# Patient Record
Sex: FEMALE | Race: WHITE | NOT HISPANIC OR LATINO
[De-identification: names, ages, dates, MRNs, and addresses within clinical notes are randomized per-mention and may not be internally consistent; named-entity substitution may affect disease eponyms.]

---

## 2019-04-08 ENCOUNTER — APPOINTMENT (OUTPATIENT)
Dept: OBGYN | Facility: CLINIC | Age: 49
End: 2019-04-08

## 2019-07-22 ENCOUNTER — APPOINTMENT (OUTPATIENT)
Dept: OBGYN | Facility: CLINIC | Age: 49
End: 2019-07-22
Payer: COMMERCIAL

## 2019-07-22 VITALS
HEIGHT: 61 IN | SYSTOLIC BLOOD PRESSURE: 110 MMHG | WEIGHT: 100 LBS | DIASTOLIC BLOOD PRESSURE: 70 MMHG | BODY MASS INDEX: 18.88 KG/M2

## 2019-07-22 DIAGNOSIS — N63.0 UNSPECIFIED LUMP IN UNSPECIFIED BREAST: ICD-10-CM

## 2019-07-22 PROCEDURE — 99212 OFFICE O/P EST SF 10 MIN: CPT | Mod: 25

## 2019-07-22 PROCEDURE — G0101: CPT

## 2019-07-22 NOTE — PHYSICAL EXAM
[Awake] : awake [Alert] : alert [Acute Distress] : no acute distress [Mass] : no breast mass [Axillary LAD] : no axillary lymphadenopathy [Nipple Discharge] : no nipple discharge [Soft] : soft [Tender] : non tender [Oriented x3] : oriented to person, place, and time [Uterine Adnexae] : were not tender and not enlarged [No Bleeding] : there was no active vaginal bleeding [Normal] : uterus [RRR, No Murmurs] : RRR, no murmurs [CTAB] : CTAB

## 2019-07-25 LAB
CYTOLOGY CVX/VAG DOC THIN PREP: ABNORMAL
HPV HIGH+LOW RISK DNA PNL CVX: NOT DETECTED

## 2021-06-15 ENCOUNTER — APPOINTMENT (OUTPATIENT)
Dept: OBGYN | Facility: CLINIC | Age: 51
End: 2021-06-15
Payer: COMMERCIAL

## 2021-06-15 VITALS
DIASTOLIC BLOOD PRESSURE: 74 MMHG | SYSTOLIC BLOOD PRESSURE: 123 MMHG | BODY MASS INDEX: 17.18 KG/M2 | HEIGHT: 61 IN | WEIGHT: 91 LBS

## 2021-06-15 DIAGNOSIS — Z00.00 ENCOUNTER FOR GENERAL ADULT MEDICAL EXAMINATION W/OUT ABNORMAL FINDINGS: ICD-10-CM

## 2021-06-15 PROCEDURE — 99396 PREV VISIT EST AGE 40-64: CPT

## 2021-06-15 PROCEDURE — 99213 OFFICE O/P EST LOW 20 MIN: CPT | Mod: 25

## 2021-06-15 PROCEDURE — 99072 ADDL SUPL MATRL&STAF TM PHE: CPT

## 2021-06-15 NOTE — DISCUSSION/SUMMARY
[FreeTextEntry1] : annual exam\par pap done\par ref mammo, dexa.\par pmb- check pel us, endo bx/see.

## 2021-06-15 NOTE — HISTORY OF PRESENT ILLNESS
[FreeTextEntry1] : 51 yo pt here for annual exam. co brown spotting for past 2 months. lmp around age 43, never had vb since until now.

## 2021-06-22 DIAGNOSIS — B96.89 ACUTE VAGINITIS: ICD-10-CM

## 2021-06-22 DIAGNOSIS — N76.0 ACUTE VAGINITIS: ICD-10-CM

## 2021-06-22 LAB
A VAGINAE DNA VAG QL NAA+PROBE: NORMAL
BVAB2 DNA VAG QL NAA+PROBE: NORMAL
C KRUSEI DNA VAG QL NAA+PROBE: NEGATIVE
C TRACH RRNA SPEC QL NAA+PROBE: NEGATIVE
CYTOLOGY CVX/VAG DOC THIN PREP: NORMAL
MEGA1 DNA VAG QL NAA+PROBE: ABNORMAL
N GONORRHOEA RRNA SPEC QL NAA+PROBE: NEGATIVE
T VAGINALIS RRNA SPEC QL NAA+PROBE: NEGATIVE

## 2021-06-25 RX ORDER — MISOPROSTOL 200 UG/1
200 TABLET ORAL
Qty: 2 | Refills: 0 | Status: ACTIVE | COMMUNITY
Start: 2021-06-25 | End: 1900-01-01

## 2021-06-30 RX ORDER — MISOPROSTOL 200 UG/1
200 TABLET ORAL
Qty: 2 | Refills: 0 | Status: ACTIVE | COMMUNITY
Start: 2021-06-30 | End: 1900-01-01

## 2021-07-08 ENCOUNTER — APPOINTMENT (OUTPATIENT)
Dept: OBGYN | Facility: CLINIC | Age: 51
End: 2021-07-08
Payer: COMMERCIAL

## 2021-07-08 ENCOUNTER — ASOB RESULT (OUTPATIENT)
Age: 51
End: 2021-07-08

## 2021-07-08 VITALS
WEIGHT: 95 LBS | BODY MASS INDEX: 17.94 KG/M2 | HEIGHT: 61 IN | HEART RATE: 75 BPM | SYSTOLIC BLOOD PRESSURE: 135 MMHG | DIASTOLIC BLOOD PRESSURE: 73 MMHG

## 2021-07-08 DIAGNOSIS — N95.0 POSTMENOPAUSAL BLEEDING: ICD-10-CM

## 2021-07-08 PROCEDURE — 76830 TRANSVAGINAL US NON-OB: CPT

## 2021-07-08 PROCEDURE — 76856 US EXAM PELVIC COMPLETE: CPT | Mod: 59

## 2021-07-08 PROCEDURE — 58558Z: CUSTOM

## 2021-07-08 PROCEDURE — 99072 ADDL SUPL MATRL&STAF TM PHE: CPT

## 2021-07-08 NOTE — PLAN
[FreeTextEntry1] : pmb, no addl vb after tx for vaginitis. \par lw cw +markers for bv. \par US wnl. reviewed results w pt. \par endosee showed normal cavity,scant tissue on endo bx. \par call w path

## 2021-07-08 NOTE — PROCEDURE
[Hysteroscopy] : Hysteroscopy [Time out performed] : Pre-procedure time out performed.  Patient's name, date of birth and procedure confirmed. [Consent Obtained] : Consent obtained [Postmenopausal bleeding] : postmenopausal bleeding [Risks] : risks [Benefits] : benefits [Alternatives] : alternatives [Patient] : patient [Infection] : infection [Bleeding] : bleeding [Allergic Reaction] : allergic reaction [Pretreatment with misoprostol] : pretreatment with misoprostol [Lidocaine___ mL] : [unfilled] ~UmL of lidocaine [flexible] : Using aseptic technique a hysteroscopy was performed using a flexible hysteroscope [Sent to Pathology] : specimen was placed in buffered formalin and sent for pathology [Antibiotics given] : antibiotics not given [Hemostasis obtained] : hemostasis obtained [Tolerated Well] : Patient tolerated the procedure well [Aftercare instructions/regstrictions given and follow-up scheduled] : Aftercare instructions/restrictions given and follow-up scheduled

## 2021-07-08 NOTE — HISTORY OF PRESENT ILLNESS
[FreeTextEntry1] : 50 yo pt here for eval of pmb. reports no addl vb since treating  bv infection. vaginitis panel showed + marker for bv. had us today, showed normal ovaries and thin endo lining. Having endo bx/endosee today

## 2021-07-14 LAB — CORE LAB BIOPSY: NORMAL

## 2021-07-22 DIAGNOSIS — R92.2 INCONCLUSIVE MAMMOGRAM: ICD-10-CM

## 2022-11-03 ENCOUNTER — NEW PATIENT (OUTPATIENT)
Dept: URBAN - METROPOLITAN AREA CLINIC 26 | Facility: CLINIC | Age: 52
End: 2022-11-03

## 2022-11-03 VITALS
SYSTOLIC BLOOD PRESSURE: 132 MMHG | WEIGHT: 98 LBS | HEIGHT: 61 IN | DIASTOLIC BLOOD PRESSURE: 66 MMHG | HEART RATE: 68 BPM | BODY MASS INDEX: 18.5 KG/M2

## 2022-11-03 DIAGNOSIS — H30.23: ICD-10-CM

## 2022-11-03 DIAGNOSIS — H35.041: ICD-10-CM

## 2022-11-03 DIAGNOSIS — H43.823: ICD-10-CM

## 2022-11-03 DIAGNOSIS — E10.3293: ICD-10-CM

## 2022-11-03 DIAGNOSIS — H43.393: ICD-10-CM

## 2022-11-03 DIAGNOSIS — H04.123: ICD-10-CM

## 2022-11-03 DIAGNOSIS — H40.1130: ICD-10-CM

## 2022-11-03 PROCEDURE — 99204 OFFICE O/P NEW MOD 45 MIN: CPT

## 2022-11-03 PROCEDURE — 92134 CPTRZ OPH DX IMG PST SGM RTA: CPT

## 2022-11-03 PROCEDURE — 92235 FLUORESCEIN ANGRPH MLTIFRAME: CPT

## 2022-11-03 PROCEDURE — 92250 FUNDUS PHOTOGRAPHY W/I&R: CPT

## 2022-11-03 RX ORDER — CYCLOPENTOLATE HYDROCHLORIDE 10 MG/ML: 1 SOLUTION OPHTHALMIC TWICE A DAY

## 2022-11-03 RX ORDER — LOTEPREDNOL ETABONATE 2.5 MG/ML: 1 SUSPENSION/ DROPS OPHTHALMIC TWICE A DAY

## 2022-11-03 ASSESSMENT — VISUAL ACUITY
OS_CC: CF 4FT
OD_SC: CF 1FT
OS_PH: 20/400
OD_PH: 20/400
OS_SC: CF 4FT

## 2022-11-03 ASSESSMENT — TONOMETRY
OD_IOP_MMHG: 12
OS_IOP_MMHG: 22

## 2022-12-01 ENCOUNTER — FOLLOW UP (OUTPATIENT)
Dept: URBAN - METROPOLITAN AREA CLINIC 26 | Facility: CLINIC | Age: 52
End: 2022-12-01

## 2022-12-01 PROCEDURE — 92012 INTRM OPH EXAM EST PATIENT: CPT

## 2022-12-01 PROCEDURE — 92250 FUNDUS PHOTOGRAPHY W/I&R: CPT

## 2022-12-01 PROCEDURE — 92134 CPTRZ OPH DX IMG PST SGM RTA: CPT

## 2022-12-01 ASSESSMENT — TONOMETRY
OS_IOP_MMHG: 25
OD_IOP_MMHG: 15

## 2022-12-01 ASSESSMENT — VISUAL ACUITY
OD_CC: CF 1FT
OS_CC: CF 1FT

## 2023-01-12 ENCOUNTER — FOLLOW UP (OUTPATIENT)
Dept: URBAN - METROPOLITAN AREA CLINIC 26 | Facility: CLINIC | Age: 53
End: 2023-01-12

## 2023-01-12 VITALS — HEIGHT: 61 IN | WEIGHT: 101 LBS | BODY MASS INDEX: 19.07 KG/M2

## 2023-01-12 DIAGNOSIS — H43.393: ICD-10-CM

## 2023-01-12 DIAGNOSIS — H04.123: ICD-10-CM

## 2023-01-12 DIAGNOSIS — H30.23: ICD-10-CM

## 2023-01-12 DIAGNOSIS — H43.823: ICD-10-CM

## 2023-01-12 DIAGNOSIS — E10.3293: ICD-10-CM

## 2023-01-12 DIAGNOSIS — H35.041: ICD-10-CM

## 2023-01-12 DIAGNOSIS — H40.1130: ICD-10-CM

## 2023-01-12 PROCEDURE — 92012 INTRM OPH EXAM EST PATIENT: CPT

## 2023-01-12 PROCEDURE — 92250 FUNDUS PHOTOGRAPHY W/I&R: CPT

## 2023-01-12 PROCEDURE — 92134 CPTRZ OPH DX IMG PST SGM RTA: CPT

## 2023-01-12 ASSESSMENT — TONOMETRY
OD_IOP_MMHG: 14
OS_IOP_MMHG: 22

## 2023-01-12 ASSESSMENT — VISUAL ACUITY
OS_CC: 20/400
OD_CC: 20/400-1

## 2023-02-17 ENCOUNTER — Encounter (OUTPATIENT)
Dept: URBAN - METROPOLITAN AREA CLINIC 113 | Facility: CLINIC | Age: 53
Setting detail: OPHTHALMOLOGY
End: 2023-02-17
Payer: MEDICARE

## 2023-09-22 DIAGNOSIS — Z86.19 PERSONAL HISTORY OF OTHER INFECTIOUS AND PARASITIC DISEASES: ICD-10-CM

## 2023-09-29 ENCOUNTER — RX ONLY (RX ONLY)
Age: 53
End: 2023-09-29

## 2023-09-29 ENCOUNTER — OFFICE (OUTPATIENT)
Dept: URBAN - METROPOLITAN AREA CLINIC 112 | Facility: CLINIC | Age: 53
Setting detail: OPHTHALMOLOGY
End: 2023-09-29
Payer: COMMERCIAL

## 2023-09-29 DIAGNOSIS — Z96.1: ICD-10-CM

## 2023-09-29 DIAGNOSIS — H40.1111: ICD-10-CM

## 2023-09-29 DIAGNOSIS — H40.1121: ICD-10-CM

## 2023-09-29 DIAGNOSIS — H26.493: ICD-10-CM

## 2023-09-29 PROCEDURE — 92250 FUNDUS PHOTOGRAPHY W/I&R: CPT | Performed by: OPHTHALMOLOGY

## 2023-09-29 PROCEDURE — 92012 INTRM OPH EXAM EST PATIENT: CPT | Performed by: OPHTHALMOLOGY

## 2023-09-29 ASSESSMENT — REFRACTION_MANIFEST
OD_ADD: +3.25
OS_VA1: 20/20
OS_AXIS: 090
OS_ADD: +3.25
OS_SPHERE: PLANO
OD_AXIS: 070
OS_CYLINDER: -1.50
OD_VA1: 20/50+
OD_SPHERE: -0.75
OD_CYLINDER: -1.00

## 2023-09-29 ASSESSMENT — CORNEAL EDEMA - FOLDS/STRIAE
OS_FOLDSSTRIAE: ABSENT
OD_FOLDSSTRIAE: ABSENT

## 2023-09-29 ASSESSMENT — REFRACTION_AUTOREFRACTION
OD_AXIS: 086
OD_CYLINDER: -1.50
OD_SPHERE: -0.50
OS_SPHERE: -1.25
OS_CYLINDER: -1.50
OS_AXIS: 090

## 2023-09-29 ASSESSMENT — SPHEQUIV_DERIVED
OD_SPHEQUIV: -1.25
OD_SPHEQUIV: -1.25
OS_SPHEQUIV: -2

## 2023-09-29 ASSESSMENT — KERATOMETRY
OS_K2POWER_DIOPTERS: 44.50
OD_K2POWER_DIOPTERS: 44.25
OD_AXISANGLE_DEGREES: 168
OD_K1POWER_DIOPTERS: 43.50
OS_K1POWER_DIOPTERS: 43.50
METHOD_AUTO_MANUAL: AUTO
OS_AXISANGLE_DEGREES: 170

## 2023-09-29 ASSESSMENT — REFRACTION_CURRENTRX
OD_CYLINDER: -0.75
OS_ADD: +2.50
OD_AXIS: 076
OD_SPHERE: -0.75
OD_OVR_VA: 20/
OS_SPHERE: PLANO
OS_CYLINDER: -1.75
OS_AXIS: 116
OD_VPRISM_DIRECTION: PROGS
OS_VPRISM_DIRECTION: PROGS
OD_ADD: +2.50
OS_OVR_VA: 20/

## 2023-09-29 ASSESSMENT — AXIALLENGTH_DERIVED
OD_AL: 23.9464
OS_AL: 24.2029
OD_AL: 23.9464

## 2023-09-29 ASSESSMENT — PACHYMETRY
OD_CT_UM: 539
OD_CT_CORRECTION: 1
OS_CT_UM: 530
OS_CT_CORRECTION: 1

## 2023-09-29 ASSESSMENT — VISUAL ACUITY
OD_BCVA: 20/100
OS_BCVA: 20/100

## 2023-09-29 ASSESSMENT — SUPERFICIAL PUNCTATE KERATITIS (SPK)
OS_SPK: 2+
OD_SPK: 2+

## 2023-09-29 ASSESSMENT — CONFRONTATIONAL VISUAL FIELD TEST (CVF)
OS_FINDINGS: FULL
OD_FINDINGS: FULL

## 2023-09-29 ASSESSMENT — CORNEAL EDEMA CLINICAL DESCRIPTION
OS_CORNEALEDEMA: ABSENT
OD_CORNEALEDEMA: ABSENT

## 2023-09-29 ASSESSMENT — TONOMETRY: OD_IOP_MMHG: 20

## 2023-10-10 ENCOUNTER — APPOINTMENT (OUTPATIENT)
Dept: INFECTIOUS DISEASE | Facility: CLINIC | Age: 53
End: 2023-10-10
Payer: COMMERCIAL

## 2023-10-10 ENCOUNTER — NON-APPOINTMENT (OUTPATIENT)
Age: 53
End: 2023-10-10

## 2023-10-10 VITALS
OXYGEN SATURATION: 98 % | BODY MASS INDEX: 21.14 KG/M2 | SYSTOLIC BLOOD PRESSURE: 120 MMHG | DIASTOLIC BLOOD PRESSURE: 70 MMHG | HEIGHT: 61 IN | TEMPERATURE: 98.2 F | HEART RATE: 68 BPM | WEIGHT: 112 LBS

## 2023-10-10 PROCEDURE — 99204 OFFICE O/P NEW MOD 45 MIN: CPT

## 2023-10-11 LAB
ALBUMIN SERPL ELPH-MCNC: 4.4 G/DL
ALP BLD-CCNC: 92 U/L
ALT SERPL-CCNC: 8 U/L
ANION GAP SERPL CALC-SCNC: 14 MMOL/L
AST SERPL-CCNC: 17 U/L
BASOPHILS # BLD AUTO: 0.07 K/UL
BASOPHILS NFR BLD AUTO: 0.8 %
BILIRUB SERPL-MCNC: 0.3 MG/DL
BUN SERPL-MCNC: 11 MG/DL
CALCIUM SERPL-MCNC: 9.3 MG/DL
CHLORIDE SERPL-SCNC: 104 MMOL/L
CO2 SERPL-SCNC: 22 MMOL/L
CREAT SERPL-MCNC: 0.55 MG/DL
EGFR: 108 ML/MIN/1.73M2
EOSINOPHIL # BLD AUTO: 0.43 K/UL
EOSINOPHIL NFR BLD AUTO: 4.6 %
GLUCOSE SERPL-MCNC: 263 MG/DL
HBV CORE IGG+IGM SER QL: NONREACTIVE
HBV CORE IGM SER QL: NONREACTIVE
HBV E AB SER QL: NONREACTIVE
HBV E AG SER QL: NONREACTIVE
HBV SURFACE AB SER QL: NONREACTIVE
HBV SURFACE AB SERPL IA-ACNC: <3 MIU/ML
HBV SURFACE AG SER QL: NONREACTIVE
HCT VFR BLD CALC: 42.1 %
HEPB DNA PCR INT: NOT DETECTED
HEPB DNA PCR LOG: NOT DETECTED LOGIU/ML
HGB BLD-MCNC: 13.7 G/DL
HIV1+2 AB SPEC QL IA.RAPID: NONREACTIVE
IMM GRANULOCYTES NFR BLD AUTO: 0.4 %
LYMPHOCYTES # BLD AUTO: 1.81 K/UL
LYMPHOCYTES NFR BLD AUTO: 19.4 %
MAN DIFF?: NORMAL
MCHC RBC-ENTMCNC: 32.5 GM/DL
MCHC RBC-ENTMCNC: 33.1 PG
MCV RBC AUTO: 101.7 FL
MONOCYTES # BLD AUTO: 0.6 K/UL
MONOCYTES NFR BLD AUTO: 6.4 %
NEUTROPHILS # BLD AUTO: 6.37 K/UL
NEUTROPHILS NFR BLD AUTO: 68.4 %
PLATELET # BLD AUTO: 248 K/UL
POTASSIUM SERPL-SCNC: 4.3 MMOL/L
PROT SERPL-MCNC: 6.6 G/DL
RBC # BLD: 4.14 M/UL
RBC # FLD: 13.2 %
SODIUM SERPL-SCNC: 139 MMOL/L
WBC # FLD AUTO: 9.32 K/UL

## 2023-10-15 LAB — HDV AB SER IA-ACNC: NEGATIVE

## 2023-10-21 ENCOUNTER — OFFICE (OUTPATIENT)
Dept: URBAN - METROPOLITAN AREA CLINIC 113 | Facility: CLINIC | Age: 53
Setting detail: OPHTHALMOLOGY
End: 2023-10-21
Payer: COMMERCIAL

## 2023-10-21 ENCOUNTER — RX ONLY (RX ONLY)
Age: 53
End: 2023-10-21

## 2023-10-21 DIAGNOSIS — H40.1131: ICD-10-CM

## 2023-10-21 DIAGNOSIS — Z96.1: ICD-10-CM

## 2023-10-21 DIAGNOSIS — H26.493: ICD-10-CM

## 2023-10-21 PROCEDURE — 99213 OFFICE O/P EST LOW 20 MIN: CPT | Performed by: OPHTHALMOLOGY

## 2023-10-21 ASSESSMENT — CONFRONTATIONAL VISUAL FIELD TEST (CVF)
OS_FINDINGS: FULL
OD_FINDINGS: FULL

## 2023-10-21 ASSESSMENT — REFRACTION_CURRENTRX
OS_OVR_VA: 20/
OD_CYLINDER: -0.75
OS_AXIS: 116
OS_VPRISM_DIRECTION: PROGS
OD_OVR_VA: 20/
OD_ADD: +2.50
OD_SPHERE: -0.75
OD_VPRISM_DIRECTION: PROGS
OS_SPHERE: PLANO
OS_CYLINDER: -1.75
OS_ADD: +2.50
OD_AXIS: 076

## 2023-10-21 ASSESSMENT — REFRACTION_AUTOREFRACTION
OS_SPHERE: -1.25
OS_AXIS: 090
OD_CYLINDER: -1.50
OD_AXIS: 086
OS_CYLINDER: -1.50
OD_SPHERE: -0.50

## 2023-10-21 ASSESSMENT — PACHYMETRY
OS_CT_UM: 530
OD_CT_UM: 539
OS_CT_CORRECTION: 1
OD_CT_CORRECTION: 1

## 2023-10-21 ASSESSMENT — KERATOMETRY
OD_AXISANGLE_DEGREES: 168
OS_AXISANGLE_DEGREES: 170
OS_K1POWER_DIOPTERS: 43.50
OD_K1POWER_DIOPTERS: 43.50
OD_K2POWER_DIOPTERS: 44.25
METHOD_AUTO_MANUAL: AUTO
OS_K2POWER_DIOPTERS: 44.50

## 2023-10-21 ASSESSMENT — TONOMETRY
OD_IOP_MMHG: 15
OS_IOP_MMHG: 20

## 2023-10-21 ASSESSMENT — REFRACTION_MANIFEST
OS_SPHERE: PLANO
OS_AXIS: 090
OD_ADD: +3.25
OD_VA1: 20/150
OS_VA1: 20/150
OD_SPHERE: -0.75
OD_AXIS: 070
OS_CYLINDER: -1.50
OS_ADD: +3.25
OD_CYLINDER: -1.00

## 2023-10-21 ASSESSMENT — SPHEQUIV_DERIVED
OS_SPHEQUIV: -2
OD_SPHEQUIV: -1.25
OD_SPHEQUIV: -1.25

## 2023-10-21 ASSESSMENT — AXIALLENGTH_DERIVED
OD_AL: 23.9464
OD_AL: 23.9464
OS_AL: 24.2029

## 2023-10-21 ASSESSMENT — CORNEAL EDEMA CLINICAL DESCRIPTION
OD_CORNEALEDEMA: ABSENT
OS_CORNEALEDEMA: ABSENT

## 2023-10-21 ASSESSMENT — CORNEAL EDEMA - FOLDS/STRIAE
OS_FOLDSSTRIAE: ABSENT
OD_FOLDSSTRIAE: ABSENT

## 2023-10-21 ASSESSMENT — VISUAL ACUITY
OD_BCVA: 20/150
OS_BCVA: 20/150

## 2023-10-21 ASSESSMENT — SUPERFICIAL PUNCTATE KERATITIS (SPK)
OD_SPK: 2+
OS_SPK: 2+

## 2023-10-24 ENCOUNTER — APPOINTMENT (OUTPATIENT)
Dept: INFECTIOUS DISEASE | Facility: CLINIC | Age: 53
End: 2023-10-24
Payer: COMMERCIAL

## 2023-10-24 VITALS
SYSTOLIC BLOOD PRESSURE: 122 MMHG | DIASTOLIC BLOOD PRESSURE: 70 MMHG | RESPIRATION RATE: 15 BRPM | OXYGEN SATURATION: 99 % | HEART RATE: 76 BPM | TEMPERATURE: 97.8 F

## 2023-10-24 DIAGNOSIS — R76.8 OTHER SPECIFIED ABNORMAL IMMUNOLOGICAL FINDINGS IN SERUM: ICD-10-CM

## 2023-10-24 PROCEDURE — 99213 OFFICE O/P EST LOW 20 MIN: CPT

## 2023-10-24 RX ORDER — METHAZOLAMIDE 50 MG/1
50 TABLET ORAL
Refills: 0 | Status: ACTIVE | COMMUNITY

## 2023-10-24 RX ORDER — METRONIDAZOLE 7.5 MG/G
0.75 GEL VAGINAL
Qty: 1 | Refills: 0 | Status: ACTIVE | COMMUNITY
Start: 2021-06-22

## 2023-11-01 ENCOUNTER — ASC (OUTPATIENT)
Dept: URBAN - METROPOLITAN AREA SURGERY 8 | Facility: SURGERY | Age: 53
Setting detail: OPHTHALMOLOGY
End: 2023-11-01
Payer: COMMERCIAL

## 2023-11-01 DIAGNOSIS — H26.491: ICD-10-CM

## 2023-11-01 PROCEDURE — 66821 AFTER CATARACT LASER SURGERY: CPT | Mod: RT | Performed by: OPHTHALMOLOGY

## 2023-11-01 ASSESSMENT — SPHEQUIV_DERIVED
OS_SPHEQUIV: -2
OD_SPHEQUIV: -1.25
OD_SPHEQUIV: -1.25

## 2023-11-01 ASSESSMENT — REFRACTION_CURRENTRX
OS_SPHERE: PLANO
OD_CYLINDER: -0.75
OD_VPRISM_DIRECTION: PROGS
OS_AXIS: 116
OD_SPHERE: -0.75
OS_OVR_VA: 20/
OS_ADD: +2.50
OD_AXIS: 076
OD_ADD: +2.50
OS_CYLINDER: -1.75
OS_VPRISM_DIRECTION: PROGS
OD_OVR_VA: 20/

## 2023-11-01 ASSESSMENT — REFRACTION_AUTOREFRACTION
OD_AXIS: 086
OD_CYLINDER: -1.50
OS_SPHERE: -1.25
OS_CYLINDER: -1.50
OD_SPHERE: -0.50
OS_AXIS: 090

## 2023-11-01 ASSESSMENT — REFRACTION_MANIFEST
OD_SPHERE: -0.75
OD_VA1: 20/150
OS_SPHERE: PLANO
OS_VA1: 20/150
OS_CYLINDER: -1.50
OD_AXIS: 070
OD_CYLINDER: -1.00
OS_ADD: +3.25
OS_AXIS: 090
OD_ADD: +3.25

## 2023-11-01 ASSESSMENT — SUPERFICIAL PUNCTATE KERATITIS (SPK)
OS_SPK: 2+
OD_SPK: 2+

## 2023-11-01 ASSESSMENT — CORNEAL EDEMA - FOLDS/STRIAE
OD_FOLDSSTRIAE: ABSENT
OS_FOLDSSTRIAE: ABSENT

## 2023-11-01 ASSESSMENT — CORNEAL EDEMA CLINICAL DESCRIPTION
OS_CORNEALEDEMA: ABSENT
OD_CORNEALEDEMA: ABSENT

## 2023-11-03 ENCOUNTER — ASC (OUTPATIENT)
Dept: URBAN - METROPOLITAN AREA SURGERY 8 | Facility: SURGERY | Age: 53
Setting detail: OPHTHALMOLOGY
End: 2023-11-03
Payer: COMMERCIAL

## 2023-11-03 DIAGNOSIS — H26.492: ICD-10-CM

## 2023-11-03 PROBLEM — H26.493 POSTERIOR CAPSULAR OPACIFICATION; RIGHT EYE, LEFT EYE, BOTH EYES: Status: ACTIVE | Noted: 2023-11-03

## 2023-11-03 PROBLEM — H26.491 POSTERIOR CAPSULAR OPACIFICATION; RIGHT EYE, LEFT EYE, BOTH EYES: Status: ACTIVE | Noted: 2023-11-03

## 2023-11-03 PROCEDURE — 66821 AFTER CATARACT LASER SURGERY: CPT | Mod: 79,LT | Performed by: OPHTHALMOLOGY

## 2023-11-03 ASSESSMENT — SUPERFICIAL PUNCTATE KERATITIS (SPK)
OS_SPK: 2+
OD_SPK: 2+

## 2023-11-03 ASSESSMENT — REFRACTION_MANIFEST
OS_SPHERE: PLANO
OS_AXIS: 090
OS_VA1: 20/150
OS_ADD: +3.25
OD_ADD: +3.25
OS_CYLINDER: -1.50
OD_VA1: 20/150
OD_SPHERE: -0.75
OD_AXIS: 070
OD_CYLINDER: -1.00

## 2023-11-03 ASSESSMENT — REFRACTION_AUTOREFRACTION
OS_AXIS: 090
OD_AXIS: 086
OD_CYLINDER: -1.50
OS_SPHERE: -1.25
OD_SPHERE: -0.50
OS_CYLINDER: -1.50

## 2023-11-03 ASSESSMENT — REFRACTION_CURRENTRX
OS_OVR_VA: 20/
OD_VPRISM_DIRECTION: PROGS
OD_SPHERE: -0.75
OD_OVR_VA: 20/
OS_SPHERE: PLANO
OS_ADD: +2.50
OS_AXIS: 116
OD_CYLINDER: -0.75
OS_VPRISM_DIRECTION: PROGS
OD_AXIS: 076
OS_CYLINDER: -1.75
OD_ADD: +2.50

## 2023-11-03 ASSESSMENT — SPHEQUIV_DERIVED
OD_SPHEQUIV: -1.25
OS_SPHEQUIV: -2
OD_SPHEQUIV: -1.25

## 2023-11-03 ASSESSMENT — CORNEAL EDEMA CLINICAL DESCRIPTION
OD_CORNEALEDEMA: ABSENT
OS_CORNEALEDEMA: ABSENT

## 2023-11-03 ASSESSMENT — CORNEAL EDEMA - FOLDS/STRIAE
OS_FOLDSSTRIAE: ABSENT
OD_FOLDSSTRIAE: ABSENT

## 2023-12-07 ENCOUNTER — OFFICE (OUTPATIENT)
Dept: URBAN - METROPOLITAN AREA CLINIC 113 | Facility: CLINIC | Age: 53
Setting detail: OPHTHALMOLOGY
End: 2023-12-07
Payer: COMMERCIAL

## 2023-12-07 DIAGNOSIS — H40.1121: ICD-10-CM

## 2023-12-07 DIAGNOSIS — Z96.1: ICD-10-CM

## 2023-12-07 DIAGNOSIS — H40.1111: ICD-10-CM

## 2023-12-07 PROCEDURE — 99024 POSTOP FOLLOW-UP VISIT: CPT | Performed by: OPHTHALMOLOGY

## 2023-12-07 ASSESSMENT — REFRACTION_AUTOREFRACTION
OS_CYLINDER: -2.25
OD_SPHERE: PLANO
OD_CYLINDER: -2.00
OS_SPHERE: -0.25
OS_AXIS: 093
OD_AXIS: 091

## 2023-12-07 ASSESSMENT — REFRACTION_CURRENTRX
OS_SPHERE: PLANO
OD_OVR_VA: 20/
OS_OVR_VA: 20/
OD_CYLINDER: -1.00
OD_AXIS: 068
OS_AXIS: 087
OD_VPRISM_DIRECTION: SV
OD_SPHERE: -0.75
OS_CYLINDER: -1.50
OS_VPRISM_DIRECTION: SV

## 2023-12-07 ASSESSMENT — REFRACTION_MANIFEST
OS_VA1: 20/150
OD_VA1: 20/150
OD_CYLINDER: -1.00
OS_SPHERE: PLANO
OD_ADD: +3.25
OS_AXIS: 090
OD_SPHERE: -0.75
OS_CYLINDER: -1.50
OD_AXIS: 070
OS_ADD: +3.25

## 2023-12-07 ASSESSMENT — CONFRONTATIONAL VISUAL FIELD TEST (CVF)
OS_FINDINGS: FULL
OD_FINDINGS: FULL

## 2023-12-07 ASSESSMENT — CORNEAL EDEMA CLINICAL DESCRIPTION
OD_CORNEALEDEMA: ABSENT
OS_CORNEALEDEMA: ABSENT

## 2023-12-07 ASSESSMENT — SUPERFICIAL PUNCTATE KERATITIS (SPK)
OS_SPK: 2+
OD_SPK: 2+

## 2023-12-07 ASSESSMENT — CORNEAL EDEMA - FOLDS/STRIAE
OS_FOLDSSTRIAE: ABSENT
OD_FOLDSSTRIAE: ABSENT

## 2023-12-07 ASSESSMENT — SPHEQUIV_DERIVED
OS_SPHEQUIV: -1.375
OD_SPHEQUIV: -1.25

## 2024-01-04 ENCOUNTER — OFFICE (OUTPATIENT)
Dept: URBAN - METROPOLITAN AREA CLINIC 113 | Facility: CLINIC | Age: 54
Setting detail: OPHTHALMOLOGY
End: 2024-01-04
Payer: COMMERCIAL

## 2024-01-04 DIAGNOSIS — H40.1131: ICD-10-CM

## 2024-01-04 PROCEDURE — 99213 OFFICE O/P EST LOW 20 MIN: CPT | Mod: 24 | Performed by: OPHTHALMOLOGY

## 2024-01-04 ASSESSMENT — REFRACTION_MANIFEST
OD_ADD: +3.25
OS_VA1: 20/150
OD_VA1: 20/150
OS_SPHERE: PLANO
OD_SPHERE: -0.75
OD_AXIS: 070
OS_CYLINDER: -1.50
OS_AXIS: 090
OD_CYLINDER: -1.00
OS_ADD: +3.25

## 2024-01-04 ASSESSMENT — REFRACTION_AUTOREFRACTION
OD_CYLINDER: -2.00
OS_AXIS: 093
OS_SPHERE: -0.25
OD_AXIS: 091
OD_SPHERE: PLANO
OS_CYLINDER: -2.25

## 2024-01-04 ASSESSMENT — SUPERFICIAL PUNCTATE KERATITIS (SPK)
OD_SPK: 2+
OS_SPK: 2+

## 2024-01-04 ASSESSMENT — CORNEAL EDEMA CLINICAL DESCRIPTION
OS_CORNEALEDEMA: ABSENT
OD_CORNEALEDEMA: ABSENT

## 2024-01-04 ASSESSMENT — REFRACTION_CURRENTRX
OS_VPRISM_DIRECTION: SV
OD_OVR_VA: 20/
OS_SPHERE: PLANO
OS_OVR_VA: 20/
OD_AXIS: 068
OD_CYLINDER: -1.00
OD_VPRISM_DIRECTION: SV
OD_SPHERE: -0.75
OS_CYLINDER: -1.50
OS_AXIS: 087

## 2024-01-04 ASSESSMENT — CONFRONTATIONAL VISUAL FIELD TEST (CVF)
OD_FINDINGS: FULL
OS_FINDINGS: FULL

## 2024-01-04 ASSESSMENT — CORNEAL EDEMA - FOLDS/STRIAE
OD_FOLDSSTRIAE: ABSENT
OS_FOLDSSTRIAE: ABSENT

## 2024-01-04 ASSESSMENT — SPHEQUIV_DERIVED
OD_SPHEQUIV: -1.25
OS_SPHEQUIV: -1.375

## 2024-02-08 ENCOUNTER — OFFICE (OUTPATIENT)
Dept: URBAN - METROPOLITAN AREA CLINIC 113 | Facility: CLINIC | Age: 54
Setting detail: OPHTHALMOLOGY
End: 2024-02-08
Payer: COMMERCIAL

## 2024-02-08 DIAGNOSIS — H40.1131: ICD-10-CM

## 2024-02-08 PROCEDURE — 99213 OFFICE O/P EST LOW 20 MIN: CPT | Performed by: OPHTHALMOLOGY

## 2024-02-08 ASSESSMENT — REFRACTION_AUTOREFRACTION
OD_SPHERE: PLANO
OS_SPHERE: -0.25
OS_CYLINDER: -2.25
OS_AXIS: 093
OD_CYLINDER: -2.00
OD_AXIS: 091

## 2024-02-08 ASSESSMENT — REFRACTION_CURRENTRX
OS_SPHERE: PLANO
OD_CYLINDER: -1.00
OD_VPRISM_DIRECTION: SV
OS_VPRISM_DIRECTION: SV
OD_OVR_VA: 20/
OS_CYLINDER: -1.50
OD_AXIS: 068
OS_OVR_VA: 20/
OD_SPHERE: -0.75
OS_AXIS: 087

## 2024-02-08 ASSESSMENT — CORNEAL EDEMA CLINICAL DESCRIPTION
OS_CORNEALEDEMA: ABSENT
OD_CORNEALEDEMA: ABSENT

## 2024-02-08 ASSESSMENT — REFRACTION_MANIFEST
OS_ADD: +3.25
OS_SPHERE: PLANO
OD_ADD: +3.25
OD_SPHERE: -0.75
OD_AXIS: 070
OD_CYLINDER: -1.00
OS_VA1: 20/150
OS_AXIS: 090
OS_CYLINDER: -1.50
OD_VA1: 20/150

## 2024-02-08 ASSESSMENT — SPHEQUIV_DERIVED
OS_SPHEQUIV: -1.375
OD_SPHEQUIV: -1.25

## 2024-02-08 ASSESSMENT — CONFRONTATIONAL VISUAL FIELD TEST (CVF)
OD_FINDINGS: FULL
OS_FINDINGS: FULL

## 2024-02-08 ASSESSMENT — CORNEAL EDEMA - FOLDS/STRIAE
OS_FOLDSSTRIAE: ABSENT
OD_FOLDSSTRIAE: ABSENT

## 2024-02-08 ASSESSMENT — SUPERFICIAL PUNCTATE KERATITIS (SPK)
OS_SPK: 2+
OD_SPK: 2+

## 2024-05-09 ENCOUNTER — RX ONLY (RX ONLY)
Age: 54
End: 2024-05-09

## 2024-05-09 ENCOUNTER — OFFICE (OUTPATIENT)
Dept: URBAN - METROPOLITAN AREA CLINIC 113 | Facility: CLINIC | Age: 54
Setting detail: OPHTHALMOLOGY
End: 2024-05-09
Payer: COMMERCIAL

## 2024-05-09 DIAGNOSIS — H40.1121: ICD-10-CM

## 2024-05-09 PROCEDURE — 92012 INTRM OPH EXAM EST PATIENT: CPT | Performed by: OPHTHALMOLOGY

## 2024-05-09 ASSESSMENT — CONFRONTATIONAL VISUAL FIELD TEST (CVF)
OS_FINDINGS: FULL
OD_FINDINGS: FULL

## 2024-06-06 ENCOUNTER — OFFICE (OUTPATIENT)
Dept: URBAN - METROPOLITAN AREA CLINIC 113 | Facility: CLINIC | Age: 54
Setting detail: OPHTHALMOLOGY
End: 2024-06-06
Payer: COMMERCIAL

## 2024-06-06 DIAGNOSIS — H40.1121: ICD-10-CM

## 2024-06-06 DIAGNOSIS — H40.1111: ICD-10-CM

## 2024-06-06 PROCEDURE — 92133 CPTRZD OPH DX IMG PST SGM ON: CPT | Performed by: OPHTHALMOLOGY

## 2024-06-06 PROCEDURE — 92014 COMPRE OPH EXAM EST PT 1/>: CPT | Performed by: OPHTHALMOLOGY

## 2024-06-06 ASSESSMENT — CONFRONTATIONAL VISUAL FIELD TEST (CVF)
OS_FINDINGS: FULL
OD_FINDINGS: FULL

## 2024-07-17 ENCOUNTER — OFFICE (OUTPATIENT)
Dept: URBAN - METROPOLITAN AREA CLINIC 105 | Facility: CLINIC | Age: 54
Setting detail: OPHTHALMOLOGY
End: 2024-07-17
Payer: COMMERCIAL

## 2024-07-17 DIAGNOSIS — E10.3292: ICD-10-CM

## 2024-07-17 DIAGNOSIS — H35.033: ICD-10-CM

## 2024-07-17 DIAGNOSIS — H43.823: ICD-10-CM

## 2024-07-17 DIAGNOSIS — E10.3211: ICD-10-CM

## 2024-07-17 PROCEDURE — 92235 FLUORESCEIN ANGRPH MLTIFRAME: CPT | Performed by: OPHTHALMOLOGY

## 2024-07-17 PROCEDURE — 92134 CPTRZ OPH DX IMG PST SGM RTA: CPT | Performed by: OPHTHALMOLOGY

## 2024-07-17 PROCEDURE — 92012 INTRM OPH EXAM EST PATIENT: CPT | Performed by: OPHTHALMOLOGY

## 2024-07-17 ASSESSMENT — CONFRONTATIONAL VISUAL FIELD TEST (CVF)
OD_FINDINGS: FULL
OS_FINDINGS: FULL

## 2024-09-04 ENCOUNTER — OFFICE (OUTPATIENT)
Dept: URBAN - METROPOLITAN AREA CLINIC 115 | Facility: CLINIC | Age: 54
Setting detail: OPHTHALMOLOGY
End: 2024-09-04
Payer: COMMERCIAL

## 2024-09-04 DIAGNOSIS — H53.433: ICD-10-CM

## 2024-09-04 DIAGNOSIS — E10.3211: ICD-10-CM

## 2024-09-04 DIAGNOSIS — H40.1111: ICD-10-CM

## 2024-09-04 DIAGNOSIS — H47.20: ICD-10-CM

## 2024-09-04 DIAGNOSIS — H43.823: ICD-10-CM

## 2024-09-04 DIAGNOSIS — H35.033: ICD-10-CM

## 2024-09-04 DIAGNOSIS — H40.1121: ICD-10-CM

## 2024-09-04 DIAGNOSIS — E10.3292: ICD-10-CM

## 2024-09-04 DIAGNOSIS — Z96.1: ICD-10-CM

## 2024-09-04 DIAGNOSIS — H16.223: ICD-10-CM

## 2024-09-04 PROCEDURE — 92014 COMPRE OPH EXAM EST PT 1/>: CPT | Performed by: OPHTHALMOLOGY

## 2024-09-04 PROCEDURE — 92202 OPSCPY EXTND ON/MAC DRAW: CPT | Performed by: OPHTHALMOLOGY

## 2024-09-04 PROCEDURE — 92083 EXTENDED VISUAL FIELD XM: CPT | Performed by: OPHTHALMOLOGY

## 2024-09-04 PROCEDURE — 92133 CPTRZD OPH DX IMG PST SGM ON: CPT | Performed by: OPHTHALMOLOGY

## 2024-09-04 ASSESSMENT — CONFRONTATIONAL VISUAL FIELD TEST (CVF)
OD_FINDINGS: FULL
OS_FINDINGS: FULL

## 2024-09-12 ENCOUNTER — OFFICE (OUTPATIENT)
Dept: URBAN - METROPOLITAN AREA CLINIC 113 | Facility: CLINIC | Age: 54
Setting detail: OPHTHALMOLOGY
End: 2024-09-12
Payer: COMMERCIAL

## 2024-09-12 DIAGNOSIS — H40.1131: ICD-10-CM

## 2024-09-12 PROCEDURE — 92250 FUNDUS PHOTOGRAPHY W/I&R: CPT | Performed by: OPHTHALMOLOGY

## 2024-09-12 PROCEDURE — 99213 OFFICE O/P EST LOW 20 MIN: CPT | Performed by: OPHTHALMOLOGY

## 2024-09-12 ASSESSMENT — CONFRONTATIONAL VISUAL FIELD TEST (CVF)
OS_FINDINGS: FULL
OD_FINDINGS: FULL

## 2024-10-02 ENCOUNTER — OFFICE (OUTPATIENT)
Dept: URBAN - METROPOLITAN AREA CLINIC 115 | Facility: CLINIC | Age: 54
Setting detail: OPHTHALMOLOGY
End: 2024-10-02
Payer: COMMERCIAL

## 2024-10-02 DIAGNOSIS — H47.20: ICD-10-CM

## 2024-10-02 DIAGNOSIS — Z96.1: ICD-10-CM

## 2024-10-02 DIAGNOSIS — H26.493: ICD-10-CM

## 2024-10-02 DIAGNOSIS — H53.433: ICD-10-CM

## 2024-10-02 DIAGNOSIS — H16.223: ICD-10-CM

## 2024-10-02 DIAGNOSIS — E10.3211: ICD-10-CM

## 2024-10-02 DIAGNOSIS — E10.3292: ICD-10-CM

## 2024-10-02 PROCEDURE — 99213 OFFICE O/P EST LOW 20 MIN: CPT | Performed by: OPHTHALMOLOGY

## 2024-10-02 ASSESSMENT — SUPERFICIAL PUNCTATE KERATITIS (SPK)
OD_SPK: 2+
OS_SPK: 2+

## 2024-10-02 ASSESSMENT — KERATOMETRY
OD_K2POWER_DIOPTERS: 44.00
OS_K1POWER_DIOPTERS: 43.00
OS_AXISANGLE_DEGREES: 173
OS_K2POWER_DIOPTERS: 44.00
OD_K1POWER_DIOPTERS: 44.00
METHOD_AUTO_MANUAL: AUTO
OD_AXISANGLE_DEGREES: 090

## 2024-10-02 ASSESSMENT — CONFRONTATIONAL VISUAL FIELD TEST (CVF)
OS_FINDINGS: FULL
OD_FINDINGS: FULL

## 2024-10-02 ASSESSMENT — REFRACTION_CURRENTRX
OD_OVR_VA: 20/
OD_AXIS: 074
OD_VPRISM_DIRECTION: BF
OD_ADD: +4.25
OS_SPHERE: PLANO
OS_CYLINDER: -1.50
OS_VPRISM_DIRECTION: BF
OS_AXIS: 088
OD_SPHERE: -0.75
OD_SPHERE: -0.75
OS_VPRISM_DIRECTION: SV
OS_ADD: +4.25
OS_CYLINDER: -1.50
OD_OVR_VA: 20/
OD_CYLINDER: 0.00
OD_AXIS: 068
OS_AXIS: 087
OD_VPRISM_DIRECTION: SV
OS_SPHERE: PLANO
OS_OVR_VA: 20/
OD_CYLINDER: -1.00
OS_OVR_VA: 20/

## 2024-10-02 ASSESSMENT — PACHYMETRY
OS_CT_UM: 530
OS_CT_CORRECTION: 1
OD_CT_CORRECTION: 1
OD_CT_UM: 539

## 2024-10-02 ASSESSMENT — VISUAL ACUITY
OS_BCVA: 20/100
OD_BCVA: 20/150

## 2024-10-02 ASSESSMENT — REFRACTION_MANIFEST
OD_SPHERE: -0.75
OS_SPHERE: PLANO
OS_VA1: 20/150
OD_AXIS: 070
OS_CYLINDER: -1.50
OD_VA1: 20/150
OD_ADD: +3.25
OD_CYLINDER: -1.00
OS_AXIS: 090
OS_ADD: +3.25

## 2024-10-02 ASSESSMENT — REFRACTION_AUTOREFRACTION
OD_AXIS: 095
OS_SPHERE: -1.00
OD_SPHERE: 0.00
OS_CYLINDER: -2.00
OS_AXIS: 087
OD_CYLINDER: -23.00

## 2024-10-02 ASSESSMENT — CORNEAL EDEMA CLINICAL DESCRIPTION
OS_CORNEALEDEMA: ABSENT
OD_CORNEALEDEMA: ABSENT

## 2024-10-02 ASSESSMENT — CORNEAL EDEMA - FOLDS/STRIAE
OS_FOLDSSTRIAE: ABSENT
OD_FOLDSSTRIAE: ABSENT

## 2025-01-16 ENCOUNTER — OFFICE (OUTPATIENT)
Dept: URBAN - METROPOLITAN AREA CLINIC 113 | Facility: CLINIC | Age: 55
Setting detail: OPHTHALMOLOGY
End: 2025-01-16
Payer: COMMERCIAL

## 2025-01-16 DIAGNOSIS — H40.1111: ICD-10-CM

## 2025-01-16 DIAGNOSIS — H40.1121: ICD-10-CM

## 2025-01-16 PROCEDURE — 92012 INTRM OPH EXAM EST PATIENT: CPT | Performed by: OPHTHALMOLOGY

## 2025-01-16 ASSESSMENT — REFRACTION_MANIFEST
OS_CYLINDER: -1.50
OS_ADD: +3.25
OD_SPHERE: -0.75
OD_VA1: 20/150
OD_AXIS: 070
OS_SPHERE: PLANO
OS_VA1: 20/150
OD_ADD: +3.25
OD_CYLINDER: -1.00
OS_AXIS: 090

## 2025-01-16 ASSESSMENT — KERATOMETRY
OD_AXISANGLE_DEGREES: 178
OS_K2POWER_DIOPTERS: 44.00
OS_AXISANGLE_DEGREES: 170
OD_K1POWER_DIOPTERS: 42.75
METHOD_AUTO_MANUAL: AUTO
OS_K1POWER_DIOPTERS: 42.50
OD_K2POWER_DIOPTERS: 44.25

## 2025-01-16 ASSESSMENT — REFRACTION_CURRENTRX
OD_VPRISM_DIRECTION: SV
OD_AXIS: 074
OS_AXIS: 088
OD_VPRISM_DIRECTION: BF
OD_CYLINDER: 0.00
OD_SPHERE: -0.75
OD_SPHERE: -0.75
OD_AXIS: 068
OS_CYLINDER: -1.50
OS_CYLINDER: -1.50
OS_SPHERE: PLANO
OS_AXIS: 087
OS_SPHERE: PLANO
OS_ADD: +4.25
OS_VPRISM_DIRECTION: SV
OS_OVR_VA: 20/
OD_ADD: +4.25
OD_CYLINDER: -1.00
OS_OVR_VA: 20/
OD_OVR_VA: 20/
OS_VPRISM_DIRECTION: BF
OD_OVR_VA: 20/

## 2025-01-16 ASSESSMENT — REFRACTION_AUTOREFRACTION
OS_AXIS: 083
OS_CYLINDER: -2.75
OD_AXIS: 090
OS_SPHERE: PLANO
OD_SPHERE: PLANO
OD_CYLINDER: -2.25

## 2025-01-16 ASSESSMENT — CORNEAL EDEMA CLINICAL DESCRIPTION
OS_CORNEALEDEMA: ABSENT
OD_CORNEALEDEMA: ABSENT

## 2025-01-16 ASSESSMENT — CONFRONTATIONAL VISUAL FIELD TEST (CVF)
OD_FINDINGS: FULL
OS_FINDINGS: FULL

## 2025-01-16 ASSESSMENT — TONOMETRY
OS_IOP_MMHG: 14
OD_IOP_MMHG: 13

## 2025-01-16 ASSESSMENT — VISUAL ACUITY
OS_BCVA: 20/150
OD_BCVA: 20/100

## 2025-01-16 ASSESSMENT — PACHYMETRY
OS_CT_CORRECTION: 1
OD_CT_CORRECTION: 1
OD_CT_UM: 539
OS_CT_UM: 530

## 2025-01-16 ASSESSMENT — SUPERFICIAL PUNCTATE KERATITIS (SPK)
OS_SPK: 2+
OD_SPK: 2+

## 2025-01-16 ASSESSMENT — CORNEAL EDEMA - FOLDS/STRIAE
OD_FOLDSSTRIAE: ABSENT
OS_FOLDSSTRIAE: ABSENT

## 2025-06-21 ENCOUNTER — OFFICE (OUTPATIENT)
Dept: URBAN - METROPOLITAN AREA CLINIC 113 | Facility: CLINIC | Age: 55
Setting detail: OPHTHALMOLOGY
End: 2025-06-21
Payer: COMMERCIAL

## 2025-06-21 DIAGNOSIS — H40.1131: ICD-10-CM

## 2025-06-21 PROCEDURE — 92012 INTRM OPH EXAM EST PATIENT: CPT | Performed by: OPHTHALMOLOGY

## 2025-06-21 ASSESSMENT — REFRACTION_CURRENTRX
OS_VPRISM_DIRECTION: BF
OD_AXIS: 074
OD_SPHERE: -0.75
OD_VPRISM_DIRECTION: SV
OD_AXIS: 068
OS_OVR_VA: 20/
OD_ADD: +4.25
OS_CYLINDER: -1.50
OS_SPHERE: PLANO
OD_VPRISM_DIRECTION: BF
OD_OVR_VA: 20/
OD_SPHERE: -0.75
OS_CYLINDER: -1.50
OS_AXIS: 087
OS_OVR_VA: 20/
OD_CYLINDER: -1.00
OS_SPHERE: PLANO
OS_AXIS: 088
OD_OVR_VA: 20/
OS_ADD: +4.25
OD_CYLINDER: 0.00
OS_VPRISM_DIRECTION: SV

## 2025-06-21 ASSESSMENT — PACHYMETRY
OD_CT_CORRECTION: 1
OS_CT_UM: 530
OD_CT_UM: 539
OS_CT_CORRECTION: 1

## 2025-06-21 ASSESSMENT — REFRACTION_MANIFEST
OD_AXIS: 070
OS_SPHERE: PLANO
OS_AXIS: 090
OD_ADD: +3.25
OD_CYLINDER: -1.00
OD_VA1: 20/150
OS_ADD: +3.25
OS_CYLINDER: -1.50
OD_SPHERE: -0.75
OS_VA1: 20/150

## 2025-06-21 ASSESSMENT — CONFRONTATIONAL VISUAL FIELD TEST (CVF)
OD_FINDINGS: FULL
OS_FINDINGS: FULL

## 2025-06-21 ASSESSMENT — REFRACTION_AUTOREFRACTION
OD_AXIS: 092
OS_SPHERE: -0.25
OD_CYLINDER: -2.00
OD_SPHERE: -0.25
OS_CYLINDER: -2.50
OS_AXIS: 087

## 2025-06-21 ASSESSMENT — VISUAL ACUITY
OS_BCVA: 20/150
OD_BCVA: 20/150

## 2025-06-21 ASSESSMENT — KERATOMETRY
OS_K2POWER_DIOPTERS: 44.50
METHOD_AUTO_MANUAL: AUTO
OS_K1POWER_DIOPTERS: 42.50
OD_K1POWER_DIOPTERS: 43.00
OD_AXISANGLE_DEGREES: 167
OS_AXISANGLE_DEGREES: 173
OD_K2POWER_DIOPTERS: 44.25

## 2025-06-21 ASSESSMENT — CORNEAL EDEMA CLINICAL DESCRIPTION
OS_CORNEALEDEMA: ABSENT
OD_CORNEALEDEMA: ABSENT

## 2025-06-21 ASSESSMENT — CORNEAL EDEMA - FOLDS/STRIAE
OS_FOLDSSTRIAE: ABSENT
OD_FOLDSSTRIAE: ABSENT

## 2025-06-21 ASSESSMENT — TONOMETRY
OS_IOP_MMHG: 12
OD_IOP_MMHG: 13

## 2025-06-21 ASSESSMENT — SUPERFICIAL PUNCTATE KERATITIS (SPK)
OD_SPK: 2+
OS_SPK: 2+